# Patient Record
Sex: FEMALE | Race: WHITE | Employment: OTHER | URBAN - METROPOLITAN AREA
[De-identification: names, ages, dates, MRNs, and addresses within clinical notes are randomized per-mention and may not be internally consistent; named-entity substitution may affect disease eponyms.]

---

## 2019-02-21 ENCOUNTER — TELEPHONE (OUTPATIENT)
Dept: ENT CLINIC | Age: 79
End: 2019-02-21

## 2019-02-21 ENCOUNTER — OFFICE VISIT (OUTPATIENT)
Dept: ENT CLINIC | Age: 79
End: 2019-02-21
Payer: MEDICARE

## 2019-02-21 VITALS — HEART RATE: 97 BPM | SYSTOLIC BLOOD PRESSURE: 128 MMHG | DIASTOLIC BLOOD PRESSURE: 64 MMHG | OXYGEN SATURATION: 96 %

## 2019-02-21 DIAGNOSIS — H60.311 CHRONIC DIFFUSE OTITIS EXTERNA OF RIGHT EAR: ICD-10-CM

## 2019-02-21 DIAGNOSIS — H61.23 BILATERAL IMPACTED CERUMEN: Primary | ICD-10-CM

## 2019-02-21 PROCEDURE — 1101F PT FALLS ASSESS-DOCD LE1/YR: CPT | Performed by: OTOLARYNGOLOGY

## 2019-02-21 PROCEDURE — 4040F PNEUMOC VAC/ADMIN/RCVD: CPT | Performed by: OTOLARYNGOLOGY

## 2019-02-21 PROCEDURE — G8484 FLU IMMUNIZE NO ADMIN: HCPCS | Performed by: OTOLARYNGOLOGY

## 2019-02-21 PROCEDURE — G8421 BMI NOT CALCULATED: HCPCS | Performed by: OTOLARYNGOLOGY

## 2019-02-21 PROCEDURE — G8427 DOCREV CUR MEDS BY ELIG CLIN: HCPCS | Performed by: OTOLARYNGOLOGY

## 2019-02-21 PROCEDURE — 69210 REMOVE IMPACTED EAR WAX UNI: CPT | Performed by: OTOLARYNGOLOGY

## 2019-02-21 PROCEDURE — 1036F TOBACCO NON-USER: CPT | Performed by: OTOLARYNGOLOGY

## 2019-02-21 PROCEDURE — 99203 OFFICE O/P NEW LOW 30 MIN: CPT | Performed by: OTOLARYNGOLOGY

## 2019-02-21 PROCEDURE — 4130F TOPICAL PREP RX AOE: CPT | Performed by: OTOLARYNGOLOGY

## 2019-02-21 PROCEDURE — G8400 PT W/DXA NO RESULTS DOC: HCPCS | Performed by: OTOLARYNGOLOGY

## 2019-02-21 PROCEDURE — 1123F ACP DISCUSS/DSCN MKR DOCD: CPT | Performed by: OTOLARYNGOLOGY

## 2019-02-21 PROCEDURE — 1090F PRES/ABSN URINE INCON ASSESS: CPT | Performed by: OTOLARYNGOLOGY

## 2019-02-21 RX ORDER — FLUOCINOLONE ACETONIDE 0.11 MG/ML
2 OIL AURICULAR (OTIC) DAILY
Qty: 10 ML | Refills: 1 | Status: SHIPPED | OUTPATIENT
Start: 2019-02-21 | End: 2019-02-21

## 2019-02-21 RX ORDER — METHYLPREDNISOLONE 4 MG/1
4 TABLET ORAL SEE ADMIN INSTRUCTIONS
Qty: 1 KIT | Refills: 0 | Status: SHIPPED | OUTPATIENT
Start: 2019-02-21 | End: 2020-07-10

## 2019-02-21 RX ORDER — TRIAMCINOLONE ACETONIDE 1 MG/G
CREAM TOPICAL
Qty: 15 TUBE | Refills: 1 | Status: SHIPPED | OUTPATIENT
Start: 2019-02-21 | End: 2020-07-10

## 2019-02-21 ASSESSMENT — ENCOUNTER SYMPTOMS
EYES NEGATIVE: 1
VOICE CHANGE: 0
SORE THROAT: 0
TROUBLE SWALLOWING: 0
RHINORRHEA: 0
ALLERGIC/IMMUNOLOGIC NEGATIVE: 1
SINUS PAIN: 0
RESPIRATORY NEGATIVE: 1
FACIAL SWELLING: 1
SINUS PRESSURE: 0

## 2019-03-06 ENCOUNTER — TELEPHONE (OUTPATIENT)
Dept: ENT CLINIC | Age: 79
End: 2019-03-06

## 2019-03-06 RX ORDER — HYDROCORTISONE AND ACETIC ACID 20.75; 10.375 MG/ML; MG/ML
SOLUTION AURICULAR (OTIC)
Qty: 15 ML | Refills: 1 | Status: SHIPPED | OUTPATIENT
Start: 2019-03-06 | End: 2020-07-10

## 2020-07-06 ENCOUNTER — TELEPHONE (OUTPATIENT)
Dept: ENT CLINIC | Age: 80
End: 2020-07-06

## 2020-07-06 RX ORDER — MECLIZINE HYDROCHLORIDE 25 MG/1
12.5 TABLET ORAL 3 TIMES DAILY
Qty: 30 TABLET | Refills: 1 | Status: SHIPPED | OUTPATIENT
Start: 2020-07-06 | End: 2020-08-12

## 2020-07-06 NOTE — TELEPHONE ENCOUNTER
Patient has been having vertigo and cannot tolerate apparently Medrol. We will try meclizine and she has an appointment for follow-up.

## 2020-07-06 NOTE — TELEPHONE ENCOUNTER
Patient is having a bout of vertigo x months. She has an appt with you on Friday. States the medication she was prescribed for dizziness makes her ears ring.  Please advise

## 2020-07-10 ENCOUNTER — OFFICE VISIT (OUTPATIENT)
Dept: ENT CLINIC | Age: 80
End: 2020-07-10
Payer: MEDICARE

## 2020-07-10 VITALS — HEART RATE: 99 BPM | TEMPERATURE: 97.8 F | DIASTOLIC BLOOD PRESSURE: 72 MMHG | SYSTOLIC BLOOD PRESSURE: 132 MMHG

## 2020-07-10 PROCEDURE — 99214 OFFICE O/P EST MOD 30 MIN: CPT | Performed by: OTOLARYNGOLOGY

## 2020-07-10 PROCEDURE — G8421 BMI NOT CALCULATED: HCPCS | Performed by: OTOLARYNGOLOGY

## 2020-07-10 PROCEDURE — 4040F PNEUMOC VAC/ADMIN/RCVD: CPT | Performed by: OTOLARYNGOLOGY

## 2020-07-10 PROCEDURE — 1036F TOBACCO NON-USER: CPT | Performed by: OTOLARYNGOLOGY

## 2020-07-10 PROCEDURE — 1123F ACP DISCUSS/DSCN MKR DOCD: CPT | Performed by: OTOLARYNGOLOGY

## 2020-07-10 PROCEDURE — G8427 DOCREV CUR MEDS BY ELIG CLIN: HCPCS | Performed by: OTOLARYNGOLOGY

## 2020-07-10 PROCEDURE — G8400 PT W/DXA NO RESULTS DOC: HCPCS | Performed by: OTOLARYNGOLOGY

## 2020-07-10 PROCEDURE — 1090F PRES/ABSN URINE INCON ASSESS: CPT | Performed by: OTOLARYNGOLOGY

## 2020-07-10 RX ORDER — METHYLPREDNISOLONE 4 MG/1
4 TABLET ORAL SEE ADMIN INSTRUCTIONS
Qty: 1 KIT | Refills: 0 | Status: SHIPPED | OUTPATIENT
Start: 2020-07-10 | End: 2020-08-12

## 2020-07-10 NOTE — PROGRESS NOTES
Patient relates that she has been having vertiginous episodes that apparently began suddenly around THE [facility] this year. She went to an urgent care facility the next day and was informed that she had an ear infection as well as urinary tract infection was treated with an antibiotic. 1 week later she continued to be having a problem and went back to urgent care facility. She was given a prescription for meclizine which resulted in no particular benefit. In June she had similar episodes that continued and she went to the emergency room where CT scanning was found to be negative and EKG was likewise negative. She was treated initially with some steroid with little improvement. Since that time, she continues to have intermittent episodic vertigo associated with nausea. She has had tinnitus but is been very markedly increasing and quite uncomfortable. She has had a decrease in her hearing described as somewhat worse on the right than the left. She had somewhat of a similar episode approximately 1 year ago but none in between. She denies any family history of similar problems and has had no history of noise exposure or significant trauma. She has been on no medications likely to precipitate similar symptoms. She does not smoke. There is been no fever. Currently, she appears in no obvious acute distress. Ear examination reveals normal-appearing tympanic membranes and ear canals bilateral.  Oral examination is unremarkable. The nasal mucosa is negative as well. The neck is free of any adenopathy, mass, thyroid enlargement. There does appear to be some slight left beating first-degree nystagmus. Romberg posturing test is basically negative. No cerebellar findings are noted. The gait appears normal.  No focal neurologic signs are noted. She had AN audiogram few weeks ago.   I read reviewed the results which indicate a mild sensorineural loss on both sides with a loss rather symmetrical.  Tympanometric findings were normal.  As result of the findings, I do feel that further diagnostic study is indicated including electronystagmogram.  I have suggested that due to the severity of the tinnitus that a trial of a Medrol Dosepak would once again be beneficial.  She may take her meclizine 12.5 mg 3 times daily as well. Further therapeutic intervention will require results of the aforementioned tests.

## 2020-07-15 ENCOUNTER — TELEPHONE (OUTPATIENT)
Dept: ENT CLINIC | Age: 80
End: 2020-07-15

## 2020-07-15 NOTE — TELEPHONE ENCOUNTER
[phone] (home)   Returned call to Pt, pt education given re steroids and ENG test.  Pt states she prefers to schedule with Dr. Anthony Rangel and will f/u after testing is completed

## 2020-07-15 NOTE — TELEPHONE ENCOUNTER
Patient called wanting to schedule an VNG at the [facility] location but had some concerns about the steroids she was prescribed. She didn't want the steroids to have an effect on the results of the test. Please advise this patient and schedule an appt for VNG.

## 2020-07-20 ENCOUNTER — TELEPHONE (OUTPATIENT)
Dept: ENT CLINIC | Age: 80
End: 2020-07-20

## 2020-07-20 RX ORDER — PREDNISONE 10 MG/1
TABLET ORAL
Qty: 25 TABLET | Refills: 0 | Status: SHIPPED | OUTPATIENT
Start: 2020-07-20 | End: 2020-08-12

## 2020-07-20 NOTE — TELEPHONE ENCOUNTER
Patient has her VNG scheduled for 8/20/2020. She is wondering if there is something she can do for the tinnitus? It has gotten worse and she has 1 day left of the steroids.      Pharmacy verified

## 2020-07-24 ENCOUNTER — TELEPHONE (OUTPATIENT)
Dept: OTOLARYNGOLOGY | Age: 80
End: 2020-07-24

## 2020-07-24 RX ORDER — SPIRONOLACTONE 25 MG/1
25 TABLET ORAL DAILY
Qty: 30 TABLET | Refills: 1 | Status: SHIPPED | OUTPATIENT
Start: 2020-07-24 | End: 2020-08-12

## 2020-07-29 ENCOUNTER — TELEPHONE (OUTPATIENT)
Dept: ENT CLINIC | Age: 80
End: 2020-07-29

## 2020-07-29 RX ORDER — HYDROCHLOROTHIAZIDE 25 MG/1
25 TABLET ORAL
Qty: 30 TABLET | Refills: 0 | Status: SHIPPED | OUTPATIENT
Start: 2020-07-29 | End: 2020-08-12

## 2020-07-29 NOTE — TELEPHONE ENCOUNTER
Pt called and said that the spironolactone is making her sick. Feels like she is going to vomit and dizzy.

## 2020-07-30 NOTE — TELEPHONE ENCOUNTER
Confirmed with patient. Patient is wondering if she needs an MRI?  Patient is requesting to speak with you

## 2020-08-05 ENCOUNTER — TELEPHONE (OUTPATIENT)
Dept: ENT CLINIC | Age: 80
End: 2020-08-05

## 2020-08-05 RX ORDER — METHYLPREDNISOLONE 4 MG/1
4 TABLET ORAL SEE ADMIN INSTRUCTIONS
Qty: 1 KIT | Refills: 0 | Status: SHIPPED | OUTPATIENT
Start: 2020-08-05 | End: 2020-08-12

## 2020-08-05 RX ORDER — AMOXICILLIN AND CLAVULANATE POTASSIUM 875; 125 MG/1; MG/1
1 TABLET, FILM COATED ORAL 2 TIMES DAILY
Qty: 20 TABLET | Refills: 0 | Status: SHIPPED | OUTPATIENT
Start: 2020-08-05

## 2020-08-05 NOTE — TELEPHONE ENCOUNTER
Pt called back - did not check her VM.  Let pt know her meds were at the pharmacy and to call back in a few days if they don't help

## 2020-08-05 NOTE — TELEPHONE ENCOUNTER
Vertigo really bothering her. Wakes up night. Ear pain and popping. Feels like something is running out. Has low temp of 96.

## 2020-08-12 ENCOUNTER — OFFICE VISIT (OUTPATIENT)
Dept: ENT CLINIC | Age: 80
End: 2020-08-12
Payer: MEDICARE

## 2020-08-12 VITALS
SYSTOLIC BLOOD PRESSURE: 125 MMHG | BODY MASS INDEX: 22.49 KG/M2 | WEIGHT: 135 LBS | HEART RATE: 103 BPM | TEMPERATURE: 98.2 F | HEIGHT: 65 IN | DIASTOLIC BLOOD PRESSURE: 76 MMHG

## 2020-08-12 PROCEDURE — 99213 OFFICE O/P EST LOW 20 MIN: CPT | Performed by: OTOLARYNGOLOGY

## 2020-08-12 PROCEDURE — 1123F ACP DISCUSS/DSCN MKR DOCD: CPT | Performed by: OTOLARYNGOLOGY

## 2020-08-12 PROCEDURE — 1036F TOBACCO NON-USER: CPT | Performed by: OTOLARYNGOLOGY

## 2020-08-12 PROCEDURE — G8420 CALC BMI NORM PARAMETERS: HCPCS | Performed by: OTOLARYNGOLOGY

## 2020-08-12 PROCEDURE — G8400 PT W/DXA NO RESULTS DOC: HCPCS | Performed by: OTOLARYNGOLOGY

## 2020-08-12 PROCEDURE — 1090F PRES/ABSN URINE INCON ASSESS: CPT | Performed by: OTOLARYNGOLOGY

## 2020-08-12 PROCEDURE — G8427 DOCREV CUR MEDS BY ELIG CLIN: HCPCS | Performed by: OTOLARYNGOLOGY

## 2020-08-12 PROCEDURE — 4040F PNEUMOC VAC/ADMIN/RCVD: CPT | Performed by: OTOLARYNGOLOGY

## 2020-08-12 RX ORDER — GABAPENTIN 100 MG/1
100 CAPSULE ORAL DAILY
Qty: 15 CAPSULE | Refills: 0 | Status: SHIPPED | OUTPATIENT
Start: 2020-08-12 | End: 2020-10-20

## 2020-08-12 RX ORDER — SERTRALINE HYDROCHLORIDE 25 MG/1
25 TABLET, FILM COATED ORAL DAILY
COMMUNITY
Start: 2020-07-23

## 2020-08-12 NOTE — PROGRESS NOTES
Patient is here with her daughter and continues to have significant problems relative to what she describes primarily as a fluid sensation within her head somewhat greater when she turns to the right. No particular change in hearing is been noted. She has had some vertiginous episodes but they are rather vaguely described. She has some nausea but not severe. Symptoms have been occurring for the past several months. This is been despite numerous medications including diuretic therapy. She is on new antidepressant. Therefore, there is a significant amount of psychological underlay. No fever is been noted. There is been no particular change in hearing. I reviewed her previous testing. Audiogram shows some mild sensorineural loss on both sides with discriminations consistent. Her electronystagmogram did show hypoactivity on the left side. That is despite the fact that most of her symptoms seem to be on the right. Oral examination is unremarkable. The nasal examination reveals some mild edema consistent with some allergy but there is no evidence of purulent infection nor is there evidence of significant obstruction. The neck is free of any adenopathy, mass, thyroid enlargement. There is no nystagmus present. Ear examination reveals normal-appearing tympanic membranes and ear canals bilaterally. I had a long discussion with her relative to what the next step might be. There is evidence of some inner ear disorder on the ENG but it is seemingly on the opposite side of most of her symptoms. Therefore, I do feel that an MRI scan would be appropriate to evaluate her situation better. I have discussed with her that if this is negative, and it most likely will be, she may be a candidate more for physical therapy rather than further medical treatment. However, given her concerns at the present time, I will try a course of gabapentin 100 mg just once daily while waiting for the MRI to be completed.

## 2020-08-14 ENCOUNTER — TELEPHONE (OUTPATIENT)
Dept: ENT CLINIC | Age: 80
End: 2020-08-14

## 2020-08-14 NOTE — TELEPHONE ENCOUNTER
Pt called with questions about her new prescription sertraline (zoloft). She wants to know will it make her ringing in the ears better or worse. He PCP prescribed it to her for her anxiety, she was wondering if it's something she should be taking? Please advise.

## 2020-08-20 RX ORDER — ALPRAZOLAM 0.25 MG/1
TABLET ORAL
Qty: 2 TABLET | Refills: 0 | OUTPATIENT
Start: 2020-08-20 | End: 2020-08-25

## 2020-08-20 NOTE — TELEPHONE ENCOUNTER
Patient went to go get her MRI, at [address], but she was unable to complete her MRI,   She is claustrophobic  ,, she is asking if there is another test that she can do     The medication that she was prescribed last Friday , she took one pill and it was to strong,  She did not take anymore after that one pill    She has dizziness,  Tuesday and today  she took a   water pill, should she continue to take those     Angela     Pt's# [phone]

## 2020-08-28 ENCOUNTER — TELEPHONE (OUTPATIENT)
Dept: ENT CLINIC | Age: 80
End: 2020-08-28

## 2020-08-28 RX ORDER — FLUOCINOLONE ACETONIDE 0.11 MG/ML
3 OIL AURICULAR (OTIC) DAILY
Qty: 20 ML | Refills: 0 | Status: SHIPPED | OUTPATIENT
Start: 2020-08-28

## 2020-08-28 RX ORDER — METHYLPREDNISOLONE 4 MG/1
4 TABLET ORAL SEE ADMIN INSTRUCTIONS
Qty: 1 KIT | Refills: 0 | Status: SHIPPED | OUTPATIENT
Start: 2020-08-28

## 2020-08-28 NOTE — TELEPHONE ENCOUNTER
Pt c/o itchy ears and is requesting drops  Pt also reports some continued sinus symptoms, congested, headache.   Please advise

## 2020-10-01 ENCOUNTER — TELEPHONE (OUTPATIENT)
Dept: ENT CLINIC | Age: 80
End: 2020-10-01

## 2020-10-01 NOTE — TELEPHONE ENCOUNTER
Call from Pt, she reports started zyrtec and its helped some with her ear popping and dizziness. She reports the gabapentin helped but is too strong and is requesting lower dose. Please advise.

## 2020-10-01 NOTE — TELEPHONE ENCOUNTER
Unfortunately, there is no lower dose of the medication. However, she can try taking it 1 every other day and see if that makes a difference. Otherwise, I may have to try something else.

## 2020-10-02 NOTE — TELEPHONE ENCOUNTER
Left a voicemail for pt she can try taking 1 every other day and see if that makes any difference. Or if not we could try something else.

## 2020-10-08 ENCOUNTER — TELEPHONE (OUTPATIENT)
Dept: ENT CLINIC | Age: 80
End: 2020-10-08

## 2020-10-08 RX ORDER — MECLIZINE HYDROCHLORIDE 25 MG/1
25 TABLET ORAL 2 TIMES DAILY
Qty: 30 TABLET | Refills: 1 | Status: SHIPPED | OUTPATIENT
Start: 2020-10-08

## 2020-10-20 RX ORDER — GABAPENTIN 100 MG/1
CAPSULE ORAL
Qty: 15 CAPSULE | Refills: 3 | Status: SHIPPED | OUTPATIENT
Start: 2020-10-20 | End: 2020-11-19

## 2020-11-10 ENCOUNTER — TELEPHONE (OUTPATIENT)
Dept: ENT CLINIC | Age: 80
End: 2020-11-10

## 2020-11-10 RX ORDER — AZITHROMYCIN 250 MG/1
TABLET, FILM COATED ORAL
Qty: 1 PACKET | Refills: 0 | Status: SHIPPED | OUTPATIENT
Start: 2020-11-10

## 2020-11-10 NOTE — TELEPHONE ENCOUNTER
Pt called c/o increased symptoms, dizziness, occasional stabbing pain, popping. Pt requesting ATB. Please advise.

## 2021-04-06 ENCOUNTER — OFFICE VISIT (OUTPATIENT)
Dept: ENT CLINIC | Age: 81
End: 2021-04-06
Payer: MEDICARE

## 2021-04-06 VITALS — HEART RATE: 104 BPM | TEMPERATURE: 97.5 F | SYSTOLIC BLOOD PRESSURE: 120 MMHG | DIASTOLIC BLOOD PRESSURE: 80 MMHG

## 2021-04-06 DIAGNOSIS — H81.09 LABYRINTHINE VERTIGO, UNSPECIFIED LATERALITY: Primary | ICD-10-CM

## 2021-04-06 PROCEDURE — 1123F ACP DISCUSS/DSCN MKR DOCD: CPT | Performed by: OTOLARYNGOLOGY

## 2021-04-06 PROCEDURE — 1036F TOBACCO NON-USER: CPT | Performed by: OTOLARYNGOLOGY

## 2021-04-06 PROCEDURE — 99214 OFFICE O/P EST MOD 30 MIN: CPT | Performed by: OTOLARYNGOLOGY

## 2021-04-06 PROCEDURE — G8427 DOCREV CUR MEDS BY ELIG CLIN: HCPCS | Performed by: OTOLARYNGOLOGY

## 2021-04-06 PROCEDURE — G8400 PT W/DXA NO RESULTS DOC: HCPCS | Performed by: OTOLARYNGOLOGY

## 2021-04-06 PROCEDURE — 1090F PRES/ABSN URINE INCON ASSESS: CPT | Performed by: OTOLARYNGOLOGY

## 2021-04-06 PROCEDURE — G8420 CALC BMI NORM PARAMETERS: HCPCS | Performed by: OTOLARYNGOLOGY

## 2021-04-06 PROCEDURE — 4040F PNEUMOC VAC/ADMIN/RCVD: CPT | Performed by: OTOLARYNGOLOGY

## 2021-04-06 RX ORDER — METHYLPREDNISOLONE 4 MG/1
4 TABLET ORAL SEE ADMIN INSTRUCTIONS
Qty: 1 KIT | Refills: 0 | Status: SHIPPED | OUTPATIENT
Start: 2021-04-06

## 2021-04-06 NOTE — PROGRESS NOTES
Over the course the past several months, the patient has been noticing a marked increase in vertigo associated with nausea and ataxia primarily to the right. She has had diminished hearing in her right ear as well. Tinnitus is been present in both ears. The symptoms have been somewhat persistent and intermittent but seem to be also becoming constant. She has tried Sudafed with little benefit. She is undergoing chiropractic therapy. Nasacort spray has not produced any improvement. She has had no fever. She does not smoke. He is concerned as to whether these episodes are going to be persistent for some time. Currently, she appears in no obvious acute distress. Ear examination reveals normal-appearing tympanic membranes and ear canals bilaterally. The oral examination is unremarkable. The nasal mucosa is mildly edematous consistent with allergy but there is no evidence of purulence nor obstruction. The neck is free of adenopathy, mass, thyroid enlargement. No focal neurologic signs are apparent. Pupils are equal round regular and reactive to light and accommodation with full ocular movement. There is some mild right beating first-degree nystagmus noted. Romberg is unsteady but this could be related to her back problem and balance issue. At this point, I do feel that it is possible she does have Ménière's disease. We will start her on a Medrol Dosepak. Will obtain ENG as well as audiometric findings to further determine therapy.

## 2021-04-15 ENCOUNTER — PROCEDURE VISIT (OUTPATIENT)
Dept: AUDIOLOGY | Age: 81
End: 2021-04-15
Payer: MEDICARE

## 2021-04-15 DIAGNOSIS — H93.13 SUBJECTIVE TINNITUS OF BOTH EARS: ICD-10-CM

## 2021-04-15 DIAGNOSIS — H90.3 SENSORINEURAL HEARING LOSS (SNHL) OF BOTH EARS: Primary | ICD-10-CM

## 2021-04-15 PROCEDURE — 92567 TYMPANOMETRY: CPT | Performed by: AUDIOLOGIST

## 2021-04-15 PROCEDURE — 92557 COMPREHENSIVE HEARING TEST: CPT | Performed by: AUDIOLOGIST

## 2021-04-15 NOTE — PROGRESS NOTES
[facility]    4/15/2021     PCP: No primary care provider on file. MRN: [id number]     AUDIOLOGIC AND OTHER PERTINENT MEDICAL HISTORY:        Ms. Mina Maldonado was seen for hearing and middle ear evaluation. Otolaryngology is referral source of today's appointment. Patient presents with debilitating tinnitus. She also reports occasional vertigo which seems to exacerbate tinnitus during onset. Hx of known hearing loss. AUDIOGRAM/IMMITTANCE (MIDDLE EAR FUNCTION):        Audiogram demonstrates mild-moderate sensory loss of both ears. Loss is symmetrical. Speech discrimination ability was excellent in quiet, at elevated levels. Immittance consistent with normal middle ear function (Type A curve)   bilaterally. Ipsilateral acoustic reflexes present. COUNSELING:          Audiogram results were reviewed with patient. RECOMMENDATIONS:        ENT to advise tinnitus treatment plan.     Electronically signed by Greg Pineda on 4/15/21 at 11:14 AM.

## 2021-04-16 ENCOUNTER — TELEPHONE (OUTPATIENT)
Dept: OTOLARYNGOLOGY | Age: 81
End: 2021-04-16

## 2021-04-16 NOTE — TELEPHONE ENCOUNTER
Audiogram demonstrates a mild sensorineural loss on both sides with the loss being symmetrical. Discrimination is consistent.  Awaiting electronystagmogram

## 2021-04-22 ENCOUNTER — TELEPHONE (OUTPATIENT)
Dept: ENT CLINIC | Age: 81
End: 2021-04-22

## 2021-04-22 NOTE — TELEPHONE ENCOUNTER
Hearing test shows mild hearing loss in both ears with likelihood hearing aids would help. However, am waiting for other test ordered(VNG) to be completed so I can talk to her about dizziness treatment.